# Patient Record
Sex: FEMALE | Race: WHITE
[De-identification: names, ages, dates, MRNs, and addresses within clinical notes are randomized per-mention and may not be internally consistent; named-entity substitution may affect disease eponyms.]

---

## 2018-02-22 ENCOUNTER — HOSPITAL ENCOUNTER (OUTPATIENT)
Dept: HOSPITAL 88 - MAMMO | Age: 68
End: 2018-02-22
Attending: FAMILY MEDICINE
Payer: MEDICARE

## 2018-02-22 DIAGNOSIS — Z12.31: Primary | ICD-10-CM

## 2018-03-05 NOTE — DIAGNOSTIC IMAGING REPORT
#[id number] - [facility]

#BILATERAL DIGITAL SCREENING MAMMOGRAM WITH CAD: 2/22/2018

CLINICAL: Routine screening.  



Comparison is made to exams dated:  2/17/2017 mammogram, 1/13/2016 mammogram and 11/20/2014 

mammogram - [facility].  Current study contains 4 films.  

The tissue of both breasts is heterogeneously dense. This may lower the sensitivity of mammography. 

 

Current study was also evaluated with a Computer Aided Detection (CAD) system.  

There are benign nodules in both breasts; some of which are smaller and some of which are larger.  

There also are benign diffuse scattered calcifications in both breasts.  

No significant masses, calcifications, or other findings are seen in either breast.  

There has been no significant interval change.



IMPRESSION: BENIGN

There is no mammographic evidence of malignancy.  A 1 year screening mammogram is recommended. 

The patient will be notified by letter of the results.  





Main Gallagher Jr., D.O.          

cw/:3/2/2018 09:14:28  



Imaging Technologist: Zoila AMADO(SILVIA)(M), [facility]

letter sent: Compared to Prior B9  

Mammogram BI-RADS: 2 Benign

## 2018-04-25 ENCOUNTER — HOSPITAL ENCOUNTER (OUTPATIENT)
Dept: HOSPITAL 88 - OR | Age: 68
Discharge: HOME | End: 2018-04-25
Attending: INTERNAL MEDICINE
Payer: MEDICARE

## 2018-04-25 DIAGNOSIS — K62.1: ICD-10-CM

## 2018-04-25 DIAGNOSIS — E78.5: ICD-10-CM

## 2018-04-25 DIAGNOSIS — I83.90: ICD-10-CM

## 2018-04-25 DIAGNOSIS — K64.8: ICD-10-CM

## 2018-04-25 DIAGNOSIS — L98.9: ICD-10-CM

## 2018-04-25 DIAGNOSIS — Z91.041: ICD-10-CM

## 2018-04-25 DIAGNOSIS — Z01.810: ICD-10-CM

## 2018-04-25 DIAGNOSIS — K50.90: Primary | ICD-10-CM

## 2018-04-25 DIAGNOSIS — R00.1: ICD-10-CM

## 2018-04-25 DIAGNOSIS — K63.3: ICD-10-CM

## 2018-04-25 DIAGNOSIS — K21.9: ICD-10-CM

## 2018-04-25 DIAGNOSIS — I10: ICD-10-CM

## 2018-04-25 DIAGNOSIS — K63.5: ICD-10-CM

## 2018-04-25 PROCEDURE — 88305 TISSUE EXAM BY PATHOLOGIST: CPT

## 2018-04-25 PROCEDURE — 93005 ELECTROCARDIOGRAM TRACING: CPT

## 2018-04-25 PROCEDURE — 45384 COLONOSCOPY W/LESION REMOVAL: CPT

## 2018-04-25 PROCEDURE — 45380 COLONOSCOPY AND BIOPSY: CPT

## 2018-04-25 NOTE — XMS REPORT
Patient Summary Document

 Created on: 2018



MACK CURIEL

External Reference #: [id number]

: 1950

Sex: Female



Demographics







 Address  [address] [place] [address]

 

 Home Phone  [phone] CELL

 

 Preferred Language  Unknown

 

 Marital Status  Unknown

 

 Religion Affiliation  Unknown

 

 Race  Unknown

 

 Additional Race(s)  

 

 Ethnic Group  Unknown





Author







 Author  [facility]

 

 Address  Unknown

 

 Phone  Unavailable







Care Team Providers







 Care Team Member Name  Role  Phone

 

 JACKIE MARRERO  Unavailable  Unavailable







Problems

This patient has no known problems.



Allergies, Adverse Reactions, Alerts

This patient has no known allergies or adverse reactions.



Medications

This patient has no known medications.



Results







 Test Description  Test Time  Test Comments  Text Results  Atomic Results  
Result Comments









 MAMMOGRAM DIGITAL SCR BI            [place] [address]      Patient Name: MACK CURIEL   MR #: [id number]    : 1950 Age/Sex: 68/F  Acct #: 
[id number] Req #: [id number]  Adm Physician:     Ordered by: JACKEI MARRERO 
DO  Report #: [id number]   Location: MAMMO  Room/Bed:     _______________________
____________________________________________________________________________    
Procedure: [id number] MG/MAMMOGRAM DIGITAL SCR BI  Exam Date: 18          
                  Exam Time: 1250       REPORT STATUS: Signed       #[id number]
[id number] - [facility]   #BILATERAL DIGITAL SCREENING MAMMOGRAM WITH CAD: 2018   
CLINICAL: Routine screening.        Comparison is made to exams dated:  2017 mammogram, 2016 mammogram and 2014    mammogram - [facility].  Current study contains 4 films.     The tissue of 
both breasts is heterogeneously dense. This may lower the sensitivity of 
mammography.        Current study was also evaluated with a Computer Aided 
Detection (CAD) system.     There are benign nodules in both breasts; some of 
which are smaller and some of which are larger.     There also are benign 
diffuse scattered calcifications in both breasts.     No significant masses, 
calcifications, or other findings are seen in either breast.     There has been 
no significant interval change.      IMPRESSION: BENIGN   There is no 
mammographic evidence of malignancy.  A 1 year screening mammogram is 
recommended.    The patient will be notified by letter of the results.         
  Julianne Gallagher Jr., D.O.             cw/:3/2/2018 09:14:28        Imaging 
Technologist: Zoila MACIAS)(REYMUNDO), [facility]   
letter sent: Compared to Prior B9     Mammogram BI-RADS: 2 Benign     Dictated 
By: JULIANNE GALLAGHER DO  Electronically Signed By: JULIANNE GALLAGHER DO on 18  Transcribed By: DIANNA on 18       COPY TO:   JACKIE MARRERO DO

## 2018-04-25 NOTE — OPERATIVE REPORT
DATE OF PROCEDURE:  April 25, 2018 



REFERRING PHYSICIAN:  Dr. Tian Marrero 



PROCEDURE PERFORMED:  Colonoscopy and polypectomy with biopsies.  



INDICATIONS FOR COLONOSCOPY:  History of Crohn disease, personal history of 

colon polyps.



MEDICATION:  Patient was done under MAC.  Please see anesthesiologist's 

note.



PROCEDURE:  With the patient in the left lateral decubitus position, the 

flexible fiberoptic Olympus colonoscope was inserted into the rectum with 

ease and advanced all the way to the cecum.  The ileocecal valve was 

intubated and the scope was advanced into the terminal ileum.  The terminal 

ileum appeared ulcerated and biopsies were obtained.  The scope was then 

withdrawn back into the colon.  It was then withdrawn slowly.  Mucosa 

overlying the ascending, transverse and descending grossly appeared to be 

within normal limits.  One polyp was hot biopsied from the sigmoid colon.  

Five polyps were hot biopsied from the rectum.  The scope was then 

retroflexed into the distal rectum and small internal hemorrhoids were 

noted, none of which was actively bleeding.  The scope was then 

straightened out.  It was subsequently withdrawn.  Patient tolerated the 

procedure well.



IMPRESSION

1.  Ulcerated terminal ileum, biopsied.

2.  Sigmoid colon polyp, hot biopsied.

3.  Rectal polyps times 5, hot biopsied.

4.  Internal hemorrhoids, none actively bleeding.





PLAN:  Follow up histology.  Check Humira antibodies.  Check Humira level.  

Timing of followup colonoscopy pending pathology report.    









DD:  04/25/2018 09:00

DT:  04/25/2018 09:10

Job#:  [id number]



cc:JACKIE MARRERO DO

## 2019-02-20 LAB
BASOPHILS # BLD AUTO: 0 10*3/UL (ref 0–0.1)
BASOPHILS NFR BLD AUTO: 0.3 % (ref 0–1)
DEPRECATED NEUTROPHILS # BLD AUTO: 3.8 10*3/UL (ref 2.1–6.9)
EOSINOPHIL # BLD AUTO: 0.1 10*3/UL (ref 0–0.4)
EOSINOPHIL NFR BLD AUTO: 1 % (ref 0–6)
ERYTHROCYTE [DISTWIDTH] IN CORD BLOOD: 14.6 % (ref 11.7–14.4)
HCT VFR BLD AUTO: 36.9 % (ref 34.2–44.1)
HGB BLD-MCNC: 11.7 G/DL (ref 12–16)
LYMPHOCYTES # BLD: 2.5 10*3/UL (ref 1–3.2)
LYMPHOCYTES NFR BLD AUTO: 35 % (ref 18–39.1)
MCH RBC QN AUTO: 29.7 PG (ref 28–32)
MCHC RBC AUTO-ENTMCNC: 31.7 G/DL (ref 31–35)
MCV RBC AUTO: 93.7 FL (ref 81–99)
MONOCYTES # BLD AUTO: 0.7 10*3/UL (ref 0.2–0.8)
MONOCYTES NFR BLD AUTO: 9.8 % (ref 4.4–11.3)
NEUTS SEG NFR BLD AUTO: 53.6 % (ref 38.7–80)
PLATELET # BLD AUTO: 237 X10E3/UL (ref 140–360)
RBC # BLD AUTO: 3.94 X10E6/UL (ref 3.6–5.1)

## 2019-02-23 ENCOUNTER — HOSPITAL ENCOUNTER (OUTPATIENT)
Dept: HOSPITAL 88 - OR | Age: 69
Discharge: HOME | End: 2019-02-23
Attending: INTERNAL MEDICINE
Payer: MEDICARE

## 2019-02-23 DIAGNOSIS — E78.5: ICD-10-CM

## 2019-02-23 DIAGNOSIS — E66.01: ICD-10-CM

## 2019-02-23 DIAGNOSIS — Z91.048: ICD-10-CM

## 2019-02-23 DIAGNOSIS — K62.1: ICD-10-CM

## 2019-02-23 DIAGNOSIS — I10: ICD-10-CM

## 2019-02-23 DIAGNOSIS — K50.90: Primary | ICD-10-CM

## 2019-02-23 PROCEDURE — 88305 TISSUE EXAM BY PATHOLOGIST: CPT

## 2019-02-23 PROCEDURE — 45384 COLONOSCOPY W/LESION REMOVAL: CPT

## 2019-02-23 PROCEDURE — 36415 COLL VENOUS BLD VENIPUNCTURE: CPT

## 2019-02-23 PROCEDURE — 85025 COMPLETE CBC W/AUTO DIFF WBC: CPT

## 2019-02-23 PROCEDURE — 93005 ELECTROCARDIOGRAM TRACING: CPT

## 2019-03-28 ENCOUNTER — HOSPITAL ENCOUNTER (OUTPATIENT)
Dept: HOSPITAL 88 - MAMMO | Age: 69
End: 2019-03-28
Attending: FAMILY MEDICINE
Payer: MEDICARE

## 2019-03-28 DIAGNOSIS — Z12.31: Primary | ICD-10-CM

## 2019-03-28 PROCEDURE — 77067 SCR MAMMO BI INCL CAD: CPT

## 2021-01-26 LAB
BASOPHILS # BLD AUTO: 0 10*3/UL (ref 0–0.1)
BASOPHILS NFR BLD AUTO: 0.5 % (ref 0–1)
DEPRECATED NEUTROPHILS # BLD AUTO: 4.4 10*3/UL (ref 2.1–6.9)
EOSINOPHIL # BLD AUTO: 0.1 10*3/UL (ref 0–0.4)
EOSINOPHIL NFR BLD AUTO: 1 % (ref 0–6)
ERYTHROCYTE [DISTWIDTH] IN CORD BLOOD: 13.8 % (ref 11.7–14.4)
HCT VFR BLD AUTO: 38.7 % (ref 34.2–44.1)
HGB BLD-MCNC: 12.4 G/DL (ref 12–16)
LYMPHOCYTES # BLD: 2.7 10*3/UL (ref 1–3.2)
LYMPHOCYTES NFR BLD AUTO: 34.5 % (ref 18–39.1)
MCH RBC QN AUTO: 28.9 PG (ref 28–32)
MCHC RBC AUTO-ENTMCNC: 32 G/DL (ref 31–35)
MCV RBC AUTO: 90.2 FL (ref 81–99)
MONOCYTES # BLD AUTO: 0.6 10*3/UL (ref 0.2–0.8)
MONOCYTES NFR BLD AUTO: 7.5 % (ref 4.4–11.3)
NEUTS SEG NFR BLD AUTO: 56.1 % (ref 38.7–80)
PLATELET # BLD AUTO: 244 X10E3/UL (ref 140–360)
RBC # BLD AUTO: 4.29 X10E6/UL (ref 3.6–5.1)

## 2021-01-29 ENCOUNTER — HOSPITAL ENCOUNTER (OUTPATIENT)
Dept: HOSPITAL 88 - OR | Age: 71
Discharge: HOME | End: 2021-01-29
Attending: INTERNAL MEDICINE
Payer: MEDICARE

## 2021-01-29 VITALS — SYSTOLIC BLOOD PRESSURE: 120 MMHG | DIASTOLIC BLOOD PRESSURE: 76 MMHG

## 2021-01-29 DIAGNOSIS — I10: ICD-10-CM

## 2021-01-29 DIAGNOSIS — K64.8: ICD-10-CM

## 2021-01-29 DIAGNOSIS — E78.00: ICD-10-CM

## 2021-01-29 DIAGNOSIS — K63.3: ICD-10-CM

## 2021-01-29 DIAGNOSIS — I44.0: ICD-10-CM

## 2021-01-29 DIAGNOSIS — Z01.810: ICD-10-CM

## 2021-01-29 DIAGNOSIS — K50.90: ICD-10-CM

## 2021-01-29 DIAGNOSIS — Z20.822: ICD-10-CM

## 2021-01-29 DIAGNOSIS — Z91.041: ICD-10-CM

## 2021-01-29 DIAGNOSIS — Z01.812: ICD-10-CM

## 2021-01-29 DIAGNOSIS — I49.3: ICD-10-CM

## 2021-01-29 DIAGNOSIS — Z91.013: ICD-10-CM

## 2021-01-29 DIAGNOSIS — Z86.010: ICD-10-CM

## 2021-01-29 DIAGNOSIS — K21.9: ICD-10-CM

## 2021-01-29 DIAGNOSIS — Z09: Primary | ICD-10-CM

## 2021-01-29 DIAGNOSIS — K63.89: ICD-10-CM

## 2021-01-29 PROCEDURE — 36415 COLL VENOUS BLD VENIPUNCTURE: CPT

## 2021-01-29 PROCEDURE — 45378 DIAGNOSTIC COLONOSCOPY: CPT

## 2021-01-29 PROCEDURE — 88305 TISSUE EXAM BY PATHOLOGIST: CPT

## 2021-01-29 PROCEDURE — 88342 IMHCHEM/IMCYTCHM 1ST ANTB: CPT

## 2021-01-29 PROCEDURE — 86140 C-REACTIVE PROTEIN: CPT

## 2021-01-29 PROCEDURE — 85025 COMPLETE CBC W/AUTO DIFF WBC: CPT

## 2021-01-29 PROCEDURE — 45380 COLONOSCOPY AND BIOPSY: CPT

## 2021-01-29 PROCEDURE — 93005 ELECTROCARDIOGRAM TRACING: CPT

## 2021-06-24 ENCOUNTER — HOSPITAL ENCOUNTER (OUTPATIENT)
Dept: HOSPITAL 88 - MAMMO | Age: 71
End: 2021-06-24
Attending: FAMILY MEDICINE
Payer: MEDICARE

## 2021-06-24 DIAGNOSIS — Z12.31: Primary | ICD-10-CM

## 2021-06-24 PROCEDURE — 77067 SCR MAMMO BI INCL CAD: CPT

## 2021-07-01 ENCOUNTER — HOSPITAL ENCOUNTER (OUTPATIENT)
Dept: HOSPITAL 88 - MAMMO | Age: 71
End: 2021-07-01
Attending: FAMILY MEDICINE
Payer: MEDICARE

## 2021-07-01 DIAGNOSIS — N63.10: ICD-10-CM

## 2021-07-01 DIAGNOSIS — N63.20: Primary | ICD-10-CM

## 2021-07-15 ENCOUNTER — HOSPITAL ENCOUNTER (OUTPATIENT)
Dept: HOSPITAL 88 - US | Age: 71
End: 2021-07-15
Attending: FAMILY MEDICINE
Payer: MEDICARE

## 2021-07-15 DIAGNOSIS — N63.20: Primary | ICD-10-CM

## 2021-07-15 PROCEDURE — 77065 DX MAMMO INCL CAD UNI: CPT

## 2021-07-15 PROCEDURE — 19083 BX BREAST 1ST LESION US IMAG: CPT

## 2021-07-15 PROCEDURE — 88305 TISSUE EXAM BY PATHOLOGIST: CPT

## 2021-07-15 PROCEDURE — 88342 IMHCHEM/IMCYTCHM 1ST ANTB: CPT

## 2023-02-09 ENCOUNTER — HOSPITAL ENCOUNTER (OUTPATIENT)
Dept: HOSPITAL 88 - RAD | Age: 73
End: 2023-02-09
Attending: INTERNAL MEDICINE
Payer: MEDICARE

## 2023-02-09 DIAGNOSIS — A15.9: Primary | ICD-10-CM

## 2023-02-09 PROCEDURE — 71046 X-RAY EXAM CHEST 2 VIEWS: CPT

## 2023-10-26 NOTE — OPERATIVE REPORT
DATE OF PROCEDURE:  02/23/2019

 

SURGEON:  Santino Gordillo MD

 

PROCEDURE:  Colonoscopy with biopsies and polypectomy.

 

INDICATIONS FOR COLONOSCOPY:  History of Crohn disease.  Ulcerative ileitis noted on

previous colonoscopy.  The patient is on Humira.  Colonoscopy is being carried out to

evaluate response to therapy. 

 

MEDICATIONS:  The patient was done under MAC.  Please see anesthesiologist's note.

 

PROCEDURE IN DETAIL:  With the patient in the left lateral decubitus position, a

flexible fiberoptic Olympus colonoscope was inserted into the rectum with ease and

advanced all the way to the cecum.  Mucosa overlying the cecum appeared to be within

normal limits.  The ileocecal valve was intubated and the terminal ileum was diffusely

ulcerated.  Biopsies were obtained.  I repeat and the terminal ileum was ulcerated.

Biopsies were obtained.  The scope was then withdrawn back into the colon.  The mucosa

overlying the ascending, transverse, descending and sigmoid appeared to be within normal

limits.  Several minute hyperplastic-appearing polyps were noted in the rectum and

approximately 6 were hot biopsied.  The scope was then retroflexed into the distal

rectum and small internal hemorrhoids were noted, none of which was actively bleeding.

The scope was then straightened out and was subsequently withdrawn.  The patient

tolerated the procedure well. 

 

IMPRESSION:  

1. Ulcerated terminal ileum.  Biopsies obtained.

2. Rectal polyps, minute, hyperplastic-appearing, approximately 6 removed per hot biopsy

forceps. 

3. Internal hemorrhoids, none actively bleeding.

 

PLAN:  Followup histology.  We will check Humira blood level.  We will check serum

antibodies against Humira.  Pending lab results, either we will increase dose of Humira

or switch to another biologic versus swapping to another class of therapy. 

 

 

 

 

______________________________

MD TANNER Londono/HEBER

D:  02/23/2019 08:58:21

T:  02/23/2019 14:40:34

Job #:  [id number]

 

cc:            Moises Moura DO PROCEDURE NOTE:    [facility]  Botulinum Toxin Procedure    Headache Neurology    October 26, 2023    Procedure:  OnabotulinumtoxinA injections for chronic migraine  Indication:  Chronic migraine    Joaquina suffers from severe intractable headaches.  She was referred by for onabotulinumtoxinA injections for headache.  Risks, benefits, and alternatives were discussed.  All questions were answered and consent given.  She decided to proceed with the injections.     Headache history, from initial consult note: 5/25/21 initial visit     Prior to initiation of botulinum toxin injections, Ms. Lutz reported 15 headache days per month, with 7-9 severe headache days per month. Her headaches are quite disabling and often interfere with her ability to function normally.    Date of last injections: 7/27/2023    Ms. Lutz reports 5-6 headache days in 90 days currently, with zero severe headache days per month.  She has noticed a wearing off phenomenon prior to this round of botulinum toxin injections, lasting 2 weeks.    Botulinum toxin injections have improved their functioning. Able to function better -social and work as headaches controlled    She has attempted other migraine prophylactic treatments in the past, which have included:  : verapamil, topiramate, fluoxetine, gabapentin.  Beta blocker has been avoided due to depression hx.      She currently takes  for headache prevention. fluoxetine, tizanidine, rimegepant    Ms. Roses pain was assessed prior to the procedure.  She rated her pain today as 0 out of 10.    Procedural Pause: Procedural pause was conducted to verify correct patient identity, procedure to be performed, correct side and site, correct patient position, and special requirements. Appropriate hand hygiene was utilized, and each injection site was prepped with alcohol wipe or Chloraprep swab.     Procedure Details: 200 units of onabotulinumtoxinA was diluted in 4 mL 0.9% normal saline. A  total of 155 units of onabotulinumtoxinA were injected using 30 gauge 0.5 in needles into the muscles listed below. 45 units of onabotulinumtoxinA were wasted.       GOAL OF PROCEDURE:  The goal of this procedure is to decrease pain and enhance functional independence.      CONSENT:  The risks, benefits, and treatment options were discussed with the patient who agreed to proceed.    Written consent was obtained     EQUIPMENT USED:  Needles-30 gauge, 0.5 inches for injections  Four 1-ml tuberculin syringes for injections  One sodium chloride 10 ml vial preservative free  Alcohol swabs    SKIN PREPARATION:  Skin preparation was performed using an alcohol wipe.      AREA/MUSCLE INJECTED:  155 units of Botox  Right upper Trapezius (upper cervical) - 5 units of Botox at 3 site/s.   Left upper Trapezius (upper cervical) - 5 units of Botox at 3 site/s.     Right cervical paraspinals - 5 units of Botox at 2 site/s.   Left cervical paraspinals - 5 units of Botox at 2 site/s.     Left occipitalis - 5 units of Botox at 3 site/s.  Right occipitalis -5 units of Botox at 3 site/s    Right Frontalis - 5 units of Botox at 2 site/s.  Left Frontalis - 5 units of Botox at 2 site/s.    Right Temporalis - 5 units of Botox at 4 site/s.  Left Temporalis - 5 units of Botox at 4 site/s.    Right  - 5 units of Botox at 1 site/s.              Left  - 5 units of Botox at 1 site/s.    Procerus - 5 units of Botox at 1 site/s.    RESPONSE TO PROCEDURE:  tolerated the procedure well and there were no immediate complications.  Patient was allowed to recover for an appropriate period of time and was discharged home in stable condition.    FOLLOW UP:    Repeat Botox injections in 12 weeks      This procedure was performed under a hospital privileging agreement with Dr. Sarabjit Shea, APRN, CNP  [facility]